# Patient Record
Sex: FEMALE | Race: OTHER | URBAN - METROPOLITAN AREA
[De-identification: names, ages, dates, MRNs, and addresses within clinical notes are randomized per-mention and may not be internally consistent; named-entity substitution may affect disease eponyms.]

---

## 2018-05-03 ENCOUNTER — OFFICE VISIT (OUTPATIENT)
Dept: URGENT CARE | Age: 58
End: 2018-05-03

## 2018-05-03 VITALS
BODY MASS INDEX: 30.15 KG/M2 | RESPIRATION RATE: 16 BRPM | DIASTOLIC BLOOD PRESSURE: 75 MMHG | OXYGEN SATURATION: 98 % | HEIGHT: 61 IN | SYSTOLIC BLOOD PRESSURE: 126 MMHG | WEIGHT: 159.7 LBS | HEART RATE: 88 BPM | TEMPERATURE: 98.3 F

## 2018-05-03 DIAGNOSIS — Z76.0 ENCOUNTER FOR MEDICATION REFILL: Primary | ICD-10-CM

## 2018-05-03 RX ORDER — GLIMEPIRIDE 2 MG/1
TABLET ORAL
COMMUNITY

## 2018-05-03 RX ORDER — ATORVASTATIN CALCIUM 10 MG/1
TABLET, FILM COATED ORAL DAILY
COMMUNITY

## 2018-05-03 RX ORDER — INSULIN GLARGINE 100 [IU]/ML
INJECTION, SOLUTION SUBCUTANEOUS
COMMUNITY
End: 2018-05-03 | Stop reason: SDUPTHER

## 2018-05-03 RX ORDER — INSULIN GLARGINE 100 [IU]/ML
20 INJECTION, SOLUTION SUBCUTANEOUS
Qty: 1 PEN | Refills: 0 | Status: SHIPPED | OUTPATIENT
Start: 2018-05-03

## 2018-05-03 RX ORDER — METFORMIN HYDROCHLORIDE 1000 MG/1
1000 TABLET ORAL 2 TIMES DAILY WITH MEALS
COMMUNITY

## 2018-05-03 NOTE — MR AVS SNAPSHOT
[address] [facility] [address] 
[phone] Patient: General Record MRN: [id number] :1960 Visit Information Date & Time Provider Department Dept. Phone Encounter #  
 5/3/2018  2:15 PM [address] [facility] [phone] [id number] Upcoming Health Maintenance Date Due DTaP/Tdap/Td series (1 - Tdap) 1982 PAP AKA CERVICAL CYTOLOGY 1982 BREAST CANCER SCRN MAMMOGRAM 2011 FOBT Q 1 YEAR AGE 50-75 2011 Influenza Age 5 to Adult 2018 Allergies as of 5/3/2018  Review Complete On: 5/3/2018 By: Rakesh Emerson No Known Allergies Current Immunizations  Never Reviewed No immunizations on file. Not reviewed this visit You Were Diagnosed With   
  
 Codes Comments Encounter for medication refill    -  Primary ICD-10-CM: Z76.0 ICD-9-CM: V68.1 Lantus- for type II DM Vitals BP Pulse Temp Resp Height(growth percentile) Weight(growth percentile) 126/75 88 98.3 °F (36.8 °C) 16 5' 1\" (1.549 m) 159 lb 11.2 oz (72.4 kg) SpO2 BMI OB Status Smoking Status 98% 30.18 kg/m2 Postmenopausal Never Smoker BMI and BSA Data Body Mass Index Body Surface Area  
 30.18 kg/m 2 1.77 m 2 Preferred Pharmacy Pharmacy Name Phone NO PHARMACY PREFERENCE Your Updated Medication List  
  
   
This list is accurate as of 5/3/18  2:48 PM.  Always use your most recent med list.  
  
  
  
  
 AMARYL 2 mg tablet Generic drug:  glimepiride Take  by mouth every morning. GLUCOPHAGE 1,000 mg tablet Generic drug:  metFORMIN Take 1,000 mg by mouth two (2) times daily (with meals). insulin glargine 100 unit/mL (3 mL) Inpn Commonly known as:  LANTUS SOLOSTAR U-100 INSULIN  
20 Units by SubCUTAneous route nightly. LIPITOR 10 mg tablet Generic drug:  atorvastatin Take  by mouth daily. Prescriptions Printed Refills  
 insulin glargine (LANTUS SOLOSTAR U-100 INSULIN) 100 unit/mL (3 mL) inpn 0 Si Units by SubCUTAneous route nightly. Class: Print Route: SubCUTAneous Introducing [facility]! Cody Acuna introduces [employer] patient portal. Now you can access parts of your medical record, email your doctor's office, and request medication refills online. 1. In your internet browser, go to [email or web address] 2. Click on the First Time User? Click Here link in the Sign In box. You will see the New Member Sign Up page. 3. Enter your Plusmo Access Code exactly as it appears below. You will not need to use this code after youve completed the sign-up process. If you do not sign up before the expiration date, you must request a new code. · Plusmo Access Code: [email or web address] Expires: 2018  2:21 PM 
 
4. Enter the last four digits of your Social Security Number (xxxx) and Date of Birth (mm/dd/yyyy) as indicated and click Submit. You will be taken to the next sign-up page. 5. Create a Plusmo ID. This will be your Plusmo login ID and cannot be changed, so think of one that is secure and easy to remember. 6. Create a Plusmo password. You can change your password at any time. 7. Enter your Password Reset Question and Answer. This can be used at a later time if you forget your password. 8. Enter your e-mail address. You will receive e-mail notification when new information is available in [address] 9. Click Sign Up. You can now view and download portions of your medical record. 10. Click the Download Summary menu link to download a portable copy of your medical information. If you have questions, please visit the Frequently Asked Questions section of the Plus[employer] website. Remember, Plusmo is NOT to be used for urgent needs. For medical emergencies, dial 911. Now available from your iPhone and Android! Please provide this summary of care documentation to your next provider. If you have any questions after today's visit, please call [phone].

## 2018-05-03 NOTE — PROGRESS NOTES
Patient is a 65 y.o. female presenting with medication refill. The history is provided by the patient. Medication Refill      h/o type II DM  On Lantus and needs refill   Doing well and no symptoms    History reviewed. No pertinent past medical history. History reviewed. No pertinent surgical history. History reviewed. No pertinent family history. Social History     Social History    Marital status:      Spouse name: N/A    Number of children: N/A    Years of education: N/A     Occupational History    Not on file. Social History Main Topics    Smoking status: Never Smoker    Smokeless tobacco: Never Used    Alcohol use Not on file    Drug use: Not on file    Sexual activity: Not on file     Other Topics Concern    Not on file     Social History Narrative    No narrative on file                ALLERGIES: Review of patient's allergies indicates no known allergies. Review of Systems   All other systems reviewed and are negative. Vitals:    18 1429   BP: 126/75   Pulse: 88   Resp: 16   Temp: 98.3 °F (36.8 °C)   SpO2: 98%   Weight: 159 lb 11.2 oz (72.4 kg)   Height: 5' 1\" (1.549 m)       Physical Exam   Nursing note and vitals reviewed. MDM    Procedures        ICD-10-CM ICD-9-CM    1. Encounter for medication refill Z76.0 V68.1     Lantus- for type II DM     Medications Ordered Today   Medications    insulin glargine (LANTUS SOLOSTAR U-100 INSULIN) 100 unit/mL (3 mL) inpn     Si Units by SubCUTAneous route nightly. Dispense:  1 Pen     Refill:  0     No results found for any visits on 18. The patients condition was discussed with the patient and they understand. The patient is to follow up with primary care doctor. If signs and symptoms become worse the pt is to go to the ER. The patient is to take medications as prescribed.